# Patient Record
Sex: FEMALE | Race: BLACK OR AFRICAN AMERICAN | URBAN - METROPOLITAN AREA
[De-identification: names, ages, dates, MRNs, and addresses within clinical notes are randomized per-mention and may not be internally consistent; named-entity substitution may affect disease eponyms.]

---

## 2017-02-28 ENCOUNTER — IMPORTED ENCOUNTER (OUTPATIENT)
Dept: URBAN - METROPOLITAN AREA CLINIC 1 | Facility: CLINIC | Age: 67
End: 2017-02-28

## 2017-02-28 PROBLEM — E11.9: Noted: 2017-02-28

## 2017-02-28 PROBLEM — H20.13: Noted: 2017-02-28

## 2017-02-28 PROBLEM — Z96.1: Noted: 2017-02-28

## 2017-02-28 PROBLEM — H40.1133: Noted: 2017-02-28

## 2017-02-28 PROBLEM — Z79.84: Noted: 2017-02-28

## 2017-02-28 PROCEDURE — 92014 COMPRE OPH EXAM EST PT 1/>: CPT

## 2017-02-28 PROCEDURE — 92133 CPTRZD OPH DX IMG PST SGM ON: CPT

## 2017-02-28 NOTE — PATIENT DISCUSSION
1.  DM Type II without sign of diabetic retinopathy and no blot heme on dilated retinal examination today OU No Macular Edema: Stable. Discussed the pathophysiology of diabetes and its effect on the eye and risk of blindness. Stressed the importance of strong glucose control. Advised of importance of at least yearly dilated examinations but to contact us immediately for any problems or concerns. 2. Type II diabetes controlled by oral medications. 3.  Severe Open Angle Glaucoma OU (0.3/0.85)- Increased IOP OU on generic drops. OCT w/ progression OS. Refer to Dr. Levy Iglesias for Trab possibly OU. Unable to use beta blockers secondary to cost. Unable to use prostaglandens secondary to Iritis. Pt to continue w/ Dorzolamide OU TID and Brimonidine 0.15% OU TID. Patient advised to be compliant with gtts. H/o ALT OS. Condition was discussed with patient and patient understands. Will continue to monitor patient for any progression in condition. Patient was advised to call us with any problems questions or concerns. 4.  Chronic Iritis OU: Quiet. Continue Pred OU QD. 5.  Pseudophakia OU- Doing well. 6. Return for an appointment for a 10 in 4 months with Dr. Lawrence Castillo.

## 2022-04-02 ASSESSMENT — TONOMETRY
OS_IOP_MMHG: 21
OD_IOP_MMHG: 22

## 2022-04-02 ASSESSMENT — VISUAL ACUITY: OS_CC: 20/20-2
